# Patient Record
Sex: FEMALE | Race: OTHER | Employment: OTHER | URBAN - METROPOLITAN AREA
[De-identification: names, ages, dates, MRNs, and addresses within clinical notes are randomized per-mention and may not be internally consistent; named-entity substitution may affect disease eponyms.]

---

## 2017-09-01 ENCOUNTER — OFFICE VISIT (OUTPATIENT)
Dept: FAMILY MEDICINE CLINIC | Facility: CLINIC | Age: 82
End: 2017-09-01

## 2017-09-01 VITALS
RESPIRATION RATE: 12 BRPM | HEART RATE: 67 BPM | WEIGHT: 124 LBS | SYSTOLIC BLOOD PRESSURE: 129 MMHG | TEMPERATURE: 98 F | DIASTOLIC BLOOD PRESSURE: 65 MMHG | BODY MASS INDEX: 24.03 KG/M2 | HEIGHT: 60.25 IN

## 2017-09-01 DIAGNOSIS — E11.9 TYPE 2 DIABETES MELLITUS WITHOUT COMPLICATION, WITH LONG-TERM CURRENT USE OF INSULIN (HCC): ICD-10-CM

## 2017-09-01 DIAGNOSIS — Z79.4 TYPE 2 DIABETES MELLITUS WITHOUT COMPLICATION, WITH LONG-TERM CURRENT USE OF INSULIN (HCC): ICD-10-CM

## 2017-09-01 DIAGNOSIS — N95.1 VAGINAL DRYNESS, MENOPAUSAL: ICD-10-CM

## 2017-09-01 DIAGNOSIS — I10 ESSENTIAL HYPERTENSION: ICD-10-CM

## 2017-09-01 DIAGNOSIS — Z76.89 ENCOUNTER TO ESTABLISH CARE: Primary | ICD-10-CM

## 2017-09-01 DIAGNOSIS — M81.0 AGE RELATED OSTEOPOROSIS, UNSPECIFIED PATHOLOGICAL FRACTURE PRESENCE: ICD-10-CM

## 2017-09-01 PROCEDURE — G0463 HOSPITAL OUTPT CLINIC VISIT: HCPCS | Performed by: FAMILY MEDICINE

## 2017-09-01 PROCEDURE — 99203 OFFICE O/P NEW LOW 30 MIN: CPT | Performed by: FAMILY MEDICINE

## 2017-09-01 RX ORDER — MELATONIN
1000 2 TIMES DAILY
COMMUNITY

## 2017-09-01 RX ORDER — ENALAPRIL MALEATE 20 MG/1
20 TABLET ORAL DAILY
COMMUNITY
End: 2017-09-01

## 2017-09-01 RX ORDER — ALENDRONATE SODIUM 70 MG/1
70 TABLET ORAL WEEKLY
Qty: 12 TABLET | Refills: 0 | Status: SHIPPED | OUTPATIENT
Start: 2017-09-01 | End: 2017-11-30

## 2017-09-01 RX ORDER — FUROSEMIDE 20 MG/1
20 TABLET ORAL DAILY
COMMUNITY

## 2017-09-01 RX ORDER — ALENDRONATE SODIUM 70 MG/1
70 TABLET ORAL WEEKLY
COMMUNITY
End: 2017-09-01

## 2017-09-01 RX ORDER — CLOPIDOGREL BISULFATE 75 MG/1
75 TABLET ORAL DAILY
COMMUNITY

## 2017-09-01 RX ORDER — TIMOLOL MALEATE 5 MG/ML
1 SOLUTION/ DROPS OPHTHALMIC DAILY
COMMUNITY
End: 2018-01-09

## 2017-09-01 RX ORDER — ENALAPRIL MALEATE 20 MG/1
20 TABLET ORAL DAILY
Qty: 90 TABLET | Refills: 0 | Status: SHIPPED | OUTPATIENT
Start: 2017-09-01 | End: 2017-12-18

## 2017-09-01 RX ORDER — SIMVASTATIN 10 MG
10 TABLET ORAL NIGHTLY
COMMUNITY
End: 2017-10-24 | Stop reason: DRUGHIGH

## 2017-09-01 NOTE — PROGRESS NOTES
HPI:    Delta Mike is a 80year old female presents to clinic to establish care. Recently moved from [facility]. Needs some of her chronic meds refilled.  Also needs an eye exam and a Mammogram referral. Will return for a physical exam. Denies any new Musculoskeletal: Negative. Skin: Negative. Neurological: Negative.         PHYSICAL EXAM:      09/01/17  1415   BP: 129/65   BP Location: Left arm   Patient Position: Sitting   Cuff Size: adult   Pulse: 67   Resp: 12   Temp: 97.6 °F (36.4 °C)   Temp Place 0.5 g vaginally twice a week. Imaging & Referrals:  OPHTHALMOLOGY - INTERNAL     Patient verbalized understanding. No barriers to learning observed.    9/1/2017  Nathan Belle MD

## 2017-09-12 ENCOUNTER — TELEPHONE (OUTPATIENT)
Dept: FAMILY MEDICINE CLINIC | Facility: CLINIC | Age: 82
End: 2017-09-12

## 2017-09-12 NOTE — TELEPHONE ENCOUNTER
What I sent in was the generic - can we call the pharmacy and see if they can tell us?  Thanks Clear

## 2017-09-12 NOTE — TELEPHONE ENCOUNTER
Pharmacist states insurance provider does cover med but co-pay is $141.00 for one tube, a 90-day supply.

## 2017-09-12 NOTE — TELEPHONE ENCOUNTER
Pt's daughter calling in to request refill for TRUE MITRIX Test stripes and states almost out of test stripes.

## 2017-09-12 NOTE — TELEPHONE ENCOUNTER
Per daughter of pt, Premarin cream doesn't cover by pt insurance and would like to know and replacement that pt insurance covers. Pls advise.

## 2017-09-12 NOTE — TELEPHONE ENCOUNTER
Pt's daughter requesting prescription for True Metrix test strips, states pt tests once a day, not listed in EMR. Prescription pended for review, please advise.

## 2017-09-13 NOTE — TELEPHONE ENCOUNTER
Spoke with pt daughter verified pt .     Pt daughter states that is still to expensive and pt has upcoming OV with Dr Carrol Frost and pt and pt daughter will address this with MD.

## 2017-09-19 DIAGNOSIS — Z12.39 SCREENING FOR BREAST CANCER: Primary | ICD-10-CM

## 2017-10-21 ENCOUNTER — OFFICE VISIT (OUTPATIENT)
Dept: FAMILY MEDICINE CLINIC | Facility: CLINIC | Age: 82
End: 2017-10-21

## 2017-10-21 ENCOUNTER — LAB ENCOUNTER (OUTPATIENT)
Dept: LAB | Age: 82
End: 2017-10-21
Attending: FAMILY MEDICINE
Payer: MEDICARE

## 2017-10-21 ENCOUNTER — HOSPITAL ENCOUNTER (OUTPATIENT)
Dept: MAMMOGRAPHY | Age: 82
Discharge: HOME | End: 2017-10-21
Attending: FAMILY MEDICINE
Payer: MEDICARE

## 2017-10-21 VITALS
SYSTOLIC BLOOD PRESSURE: 122 MMHG | HEIGHT: 62 IN | WEIGHT: 122 LBS | DIASTOLIC BLOOD PRESSURE: 77 MMHG | RESPIRATION RATE: 16 BRPM | BODY MASS INDEX: 22.45 KG/M2 | TEMPERATURE: 97 F | HEART RATE: 52 BPM

## 2017-10-21 DIAGNOSIS — I10 ESSENTIAL HYPERTENSION: ICD-10-CM

## 2017-10-21 DIAGNOSIS — Z12.39 SCREENING FOR BREAST CANCER: ICD-10-CM

## 2017-10-21 DIAGNOSIS — Z79.4 TYPE 2 DIABETES MELLITUS WITHOUT COMPLICATION, WITH LONG-TERM CURRENT USE OF INSULIN (HCC): Primary | ICD-10-CM

## 2017-10-21 DIAGNOSIS — E11.9 TYPE 2 DIABETES MELLITUS WITHOUT COMPLICATION, WITH LONG-TERM CURRENT USE OF INSULIN (HCC): Primary | ICD-10-CM

## 2017-10-21 DIAGNOSIS — E11.9 TYPE 2 DIABETES MELLITUS WITHOUT COMPLICATION, WITHOUT LONG-TERM CURRENT USE OF INSULIN (HCC): ICD-10-CM

## 2017-10-21 PROCEDURE — 83036 HEMOGLOBIN GLYCOSYLATED A1C: CPT

## 2017-10-21 PROCEDURE — G0463 HOSPITAL OUTPT CLINIC VISIT: HCPCS | Performed by: FAMILY MEDICINE

## 2017-10-21 PROCEDURE — G0009 ADMIN PNEUMOCOCCAL VACCINE: HCPCS | Performed by: FAMILY MEDICINE

## 2017-10-21 PROCEDURE — 90715 TDAP VACCINE 7 YRS/> IM: CPT | Performed by: FAMILY MEDICINE

## 2017-10-21 PROCEDURE — 84443 ASSAY THYROID STIM HORMONE: CPT

## 2017-10-21 PROCEDURE — 99214 OFFICE O/P EST MOD 30 MIN: CPT | Performed by: FAMILY MEDICINE

## 2017-10-21 PROCEDURE — 80061 LIPID PANEL: CPT

## 2017-10-21 PROCEDURE — 36415 COLL VENOUS BLD VENIPUNCTURE: CPT

## 2017-10-21 PROCEDURE — 90471 IMMUNIZATION ADMIN: CPT | Performed by: FAMILY MEDICINE

## 2017-10-21 PROCEDURE — 90670 PCV13 VACCINE IM: CPT | Performed by: FAMILY MEDICINE

## 2017-10-21 PROCEDURE — 85025 COMPLETE CBC W/AUTO DIFF WBC: CPT

## 2017-10-21 PROCEDURE — 80053 COMPREHEN METABOLIC PANEL: CPT

## 2017-10-21 PROCEDURE — 90686 IIV4 VACC NO PRSV 0.5 ML IM: CPT | Performed by: FAMILY MEDICINE

## 2017-10-21 PROCEDURE — G0008 ADMIN INFLUENZA VIRUS VAC: HCPCS | Performed by: FAMILY MEDICINE

## 2017-10-21 PROCEDURE — 77067 SCR MAMMO BI INCL CAD: CPT | Performed by: FAMILY MEDICINE

## 2017-10-21 RX ORDER — GABAPENTIN 100 MG/1
100 CAPSULE ORAL NIGHTLY
Qty: 90 CAPSULE | Refills: 0 | Status: SHIPPED | OUTPATIENT
Start: 2017-10-21 | End: 2018-01-10

## 2017-10-21 RX ORDER — GABAPENTIN 100 MG/1
100 CAPSULE ORAL NIGHTLY
COMMUNITY
End: 2017-10-21

## 2017-10-21 NOTE — PROGRESS NOTES
HPI:    Laney Payne is a 80year old female presents to clinic for follow up. Needs refill of Gabapentin. Otherwise, denies any new symptoms. Reports compliance with her meds.  Patient denies HAs, blurry vision, nausea, vomiting, CP, palpitations, d Allergies      ROS:   See HPI    PHYSICAL EXAM:      10/21/17  0859   BP: 122/77   BP Location: Left arm   Patient Position: Sitting   Cuff Size: adult   Pulse: 52   Resp: 16   Temp: (!) 97.2 °F (36.2 °C)   TempSrc: Tympanic   Weight: 122 lb (55.3 kg)   He ML     Patient verbalized understanding. No barriers to learning observed.    10/21/2017  Simeon Nielsen MD

## 2017-10-23 NOTE — TELEPHONE ENCOUNTER
Current Outpatient Prescriptions:  simvastatin 10 MG Oral Tab Take 10 mg by mouth nightly.  Disp:  Rfl:

## 2017-10-24 ENCOUNTER — TELEPHONE (OUTPATIENT)
Dept: FAMILY MEDICINE CLINIC | Facility: CLINIC | Age: 82
End: 2017-10-24

## 2017-10-24 RX ORDER — SIMVASTATIN 20 MG
20 TABLET ORAL NIGHTLY
Qty: 90 TABLET | Refills: 0 | Status: SHIPPED | OUTPATIENT
Start: 2017-10-24

## 2017-10-24 RX ORDER — SIMVASTATIN 10 MG
10 TABLET ORAL NIGHTLY
Qty: 90 TABLET | Refills: 0 | OUTPATIENT
Start: 2017-10-24

## 2017-10-24 NOTE — TELEPHONE ENCOUNTER
Labs discussed with patient's niece per patients request. Lipid panel is a bit elevated, patient's statin increased to 20 mg. A1C of 7.3 which is satisfactory. Advised low fat diet.  All other labs normal. Advised low fat diet, to follow up in 3 months or s

## 2017-12-14 ENCOUNTER — TELEPHONE (OUTPATIENT)
Dept: FAMILY MEDICINE CLINIC | Facility: CLINIC | Age: 82
End: 2017-12-14

## 2017-12-14 NOTE — TELEPHONE ENCOUNTER
On Saturday January 27th patient would like to come in to be seen back to back with her family. Please advise if it's ok to book?

## 2017-12-18 RX ORDER — ENALAPRIL MALEATE 20 MG/1
TABLET ORAL
Qty: 90 TABLET | Refills: 0 | Status: SHIPPED | OUTPATIENT
Start: 2017-12-18

## 2018-01-03 NOTE — TELEPHONE ENCOUNTER
[facility]  [place], [place]     Current Outpatient Prescriptions:  Glucose Blood (TRUE METRIX BLOOD GLUCOSE TEST) In Vitro Strip To check blood sugar once a day Disp: 100 each Rfl: 3

## 2018-01-04 NOTE — TELEPHONE ENCOUNTER
Refilled per protocol.    Refill Protocol Appointment Criteria  · Appointment scheduled in the past 12 months or in the next 3 months  Recent Outpatient Visits            2 months ago Type 2 diabetes mellitus without complication, with long-term current use

## 2018-01-09 ENCOUNTER — OFFICE VISIT (OUTPATIENT)
Dept: OPHTHALMOLOGY | Facility: CLINIC | Age: 83
End: 2018-01-09

## 2018-01-09 DIAGNOSIS — H43.393 FLOATER, VITREOUS, BILATERAL: ICD-10-CM

## 2018-01-09 DIAGNOSIS — E10.9 TYPE 1 DIABETES MELLITUS WITHOUT RETINOPATHY (HCC): Primary | ICD-10-CM

## 2018-01-09 DIAGNOSIS — H40.1132 PRIMARY OPEN ANGLE GLAUCOMA (POAG) OF BOTH EYES, MODERATE STAGE: ICD-10-CM

## 2018-01-09 DIAGNOSIS — Z96.1 PSEUDOPHAKIA OF BOTH EYES: ICD-10-CM

## 2018-01-09 PROCEDURE — 92250 FUNDUS PHOTOGRAPHY W/I&R: CPT | Performed by: OPHTHALMOLOGY

## 2018-01-09 PROCEDURE — 92015 DETERMINE REFRACTIVE STATE: CPT | Performed by: OPHTHALMOLOGY

## 2018-01-09 PROCEDURE — 92004 COMPRE OPH EXAM NEW PT 1/>: CPT | Performed by: OPHTHALMOLOGY

## 2018-01-09 NOTE — PATIENT INSTRUCTIONS
Type 1 diabetes mellitus without retinopathy ([facility] [address]  Diabetes type I: no background retinopathy, no signs of neovascularization noted. Discussed ocular and systemic benefits of blood sugar control.     Primary open angle glaucoma (POAG) of both eyes, modera

## 2018-01-09 NOTE — PROGRESS NOTES
Paul Caceres is a 80year old female.     HPI:     HPI     Consult    Additional comments: Consult per Dr. Checo Morris           Diabetic Eye Exam    Additional comments: Pt has been a diabetic for 20+ years  20+ years on pills/ 8 years on Insulin taking p Rfl: 0   gabapentin 100 MG Oral Cap Take 1 capsule (100 mg total) by mouth nightly. Disp: 90 capsule Rfl: 0   Clopidogrel Bisulfate 75 MG Oral Tab Take 75 mg by mouth daily.  Disp:  Rfl:    Vitamin B-12 1000 MCG Oral Tab Take 1,000 mcg by mouth 2 (two) time Macula Normal- no BDR Normal- no BDR    Vessels Normal Normal    Periphery Normal Normal            Refraction     Wearing Rx       Sphere Cylinder Axis Add    Right -2.25 +1.00 080 2.75    Left -1.25 +0.50 010 2.75    Type:  Flat top bifocal          M

## 2018-01-09 NOTE — ASSESSMENT & PLAN NOTE
Discussed that patient has glaucoma in both eyes. Photos were taken today to document optic nerves. Stop Timolol in the right eye. Use Bimatoprost 0.01%  at bedtime in both eyes.    Will have patient back for next available visual field, OCT and pachy w

## 2018-01-10 RX ORDER — GABAPENTIN 100 MG/1
CAPSULE ORAL
Qty: 90 CAPSULE | Refills: 0 | Status: SHIPPED | OUTPATIENT
Start: 2018-01-10

## 2018-01-13 ENCOUNTER — NURSE ONLY (OUTPATIENT)
Dept: OPHTHALMOLOGY | Facility: CLINIC | Age: 83
End: 2018-01-13

## 2018-01-13 DIAGNOSIS — H40.1132 PRIMARY OPEN ANGLE GLAUCOMA (POAG) OF BOTH EYES, MODERATE STAGE: ICD-10-CM

## 2018-01-13 DIAGNOSIS — E10.9 TYPE 1 DIABETES MELLITUS WITHOUT RETINOPATHY (HCC): ICD-10-CM

## 2018-01-13 PROCEDURE — 92133 CPTRZD OPH DX IMG PST SGM ON: CPT | Performed by: OPHTHALMOLOGY

## 2018-01-13 PROCEDURE — 92083 EXTENDED VISUAL FIELD XM: CPT | Performed by: OPHTHALMOLOGY

## 2018-01-13 PROCEDURE — 76514 ECHO EXAM OF EYE THICKNESS: CPT | Performed by: OPHTHALMOLOGY

## 2018-01-14 NOTE — PROGRESS NOTES
Sydnee Jean Baptiste is a 80year old female. HPI:     HPI     Patient is here for a glaucoma work up with HVF, OCT and Pachy, tana [place]     Last edited by Author Apgar on 2018 10:07 AM. (History)        Patient History:  Past Medical History:   Mariola Boles Disp:  Rfl:    Estrogens, Conjugated 0.625 MG/GM Vaginal Cream Place 0.5 g vaginally twice a week.  Disp: 30 g Rfl: 0       Allergies:  No Known Allergies    ROS:       PHYSICAL EXAM:     Base Eye Exam     Pachymetry (1/13/2018)       Right Left    [place]

## 2018-01-16 ENCOUNTER — TELEPHONE (OUTPATIENT)
Dept: OPHTHALMOLOGY | Facility: CLINIC | Age: 83
End: 2018-01-16

## 2018-02-12 ENCOUNTER — TELEPHONE (OUTPATIENT)
Dept: FAMILY MEDICINE CLINIC | Facility: CLINIC | Age: 83
End: 2018-02-12

## 2018-02-12 NOTE — TELEPHONE ENCOUNTER
Informed  niece Lynna Duverney that patient  is eligible for [facility] Annual Health Assessment appointment. She will inform patient to call back to schedule.

## 2018-03-07 NOTE — TELEPHONE ENCOUNTER
CALLED THE PT a 2nd time , pt's niece Elder Jacobson stated pt is in [place], she will call the OPO when pt return in June 2018

## 2021-03-09 DIAGNOSIS — Z23 NEED FOR VACCINATION: ICD-10-CM

## (undated) NOTE — LETTER
January 9, 2018    Simeon Nielsen MD  [address] [address] [facility]     Patient: Hanh Snow   YOB: 1935   Date of Visit: 1/9/2018       Dear Dr. Johnathon Alvarez MD:    Thank you for referring Hanh Snow to me for evalu Bimatoprost 0.01 % Ophthalmic Solution Use 1 drop in both eyes at bedtime Disp: 3 Bottle Rfl: 3   Glucose Blood In Vitro Strip To check blood sugar once a day Disp: 100 each Rfl: 0   ENALAPRIL MALEATE 20 MG Oral Tab TAKE 1 TABLET(20 MG) BY MOUTH DAILY Disp Conjunctiva/Sclera superior Bleb nasal/ temp ping, no bleb    Cornea nasal pterygium  Clear    Anterior Chamber Deep and quiet Deep and quiet    Iris PI @ 11 o'clock Normal    Lens PC IOL in sulcus with YAG  PC IOL with clear PC capsule     Vitreous Vitre Signed Prescriptions Disp Refills    Bimatoprost 0.01 % Ophthalmic Solution 3 Bottle 3      Sig: Use 1 drop in both eyes at bedtime            Follow up instructions:  Return for Next avail, VF,OCT and pachy w/ no MD and 4 months IOP check   .     1/9/2018